# Patient Record
Sex: MALE | Race: WHITE | NOT HISPANIC OR LATINO | Employment: FULL TIME | URBAN - NONMETROPOLITAN AREA
[De-identification: names, ages, dates, MRNs, and addresses within clinical notes are randomized per-mention and may not be internally consistent; named-entity substitution may affect disease eponyms.]

---

## 2021-07-16 NOTE — PROGRESS NOTES
"Subjective    Mr. Metzger is 41 y.o. male    Chief Complaint: Urinary Frequency    History of Present Illness  Patient is a 41-year-old gentleman who presents today as a new patient.  He presents with worsening intermittent nocturia frequency urgency and at times some hesitancy with feeling of incomplete bladder emptying.  This is occurred for over the past 2 to 3 years.  He has seen Dr. Crockett at [facility].  He was placed on tamsulosin also was treated with a few antibiotics for suspected prostatitis.  Patient states he has had no appreciable change in his symptoms either during antibiotic or being on tamsulosin.  Urologic history patient had episode of suspected prostatitis in his early 20s that has been the only other urologic problem he has had in the past.  No personal or family history of prostate cancer.  No history of kidney stones or hematuria.      The following portions of the patient's history were reviewed and updated as appropriate: allergies, current medications, past family history, past medical history, past social history, past surgical history and problem list.    Review of Systems    No current outpatient medications on file.    History reviewed. No pertinent past medical history.    Past Surgical History:   Procedure Laterality Date   • WISDOM TOOTH EXTRACTION         Social History     Socioeconomic History   • Marital status:      Spouse name: Not on file   • Number of children: Not on file   • Years of education: Not on file   • Highest education level: Not on file   Tobacco Use   • Smoking status: Never Smoker   • Smokeless tobacco: Never Used   Vaping Use   • Vaping Use: Never used   Substance and Sexual Activity   • Alcohol use: Never   • Drug use: Never   • Sexual activity: Yes     Partners: Female       Family History   Problem Relation Age of Onset   • Hypertension Father        Objective    Temp 97.6 °F (36.4 °C) (Temporal)   Ht 191.8 cm (75.5\")   Wt 94.5 kg (208 lb 6.4 oz)   " BMI 25.70 kg/m²     Physical Exam  Vitals reviewed.   Constitutional:       Appearance: Normal appearance.   HENT:      Head: Normocephalic and atraumatic.      Right Ear: External ear normal.      Left Ear: External ear normal.   Eyes:      Conjunctiva/sclera: Conjunctivae normal.   Pulmonary:      Effort: Pulmonary effort is normal.   Abdominal:      Palpations: Abdomen is soft.      Tenderness: There is no abdominal tenderness. There is no right CVA tenderness or left CVA tenderness.   Genitourinary:     Prostate: Enlarged and tender. No nodules present.      Rectum: Normal.      Comments: Digital rectal exam revealed a smooth soft symmetric mildly enlarged prostate no suspicious lesions masses or firmness or asymmetry were palpated.  Some mild tenderness with palpation.  Skin:     General: Skin is warm and dry.   Neurological:      General: No focal deficit present.      Mental Status: He is alert and oriented to person, place, and time.   Psychiatric:         Mood and Affect: Mood normal.         Behavior: Behavior normal.             Results for orders placed or performed in visit on 07/23/21   POC Urinalysis Dipstick, Multipro    Specimen: Urine   Result Value Ref Range    Color Yellow Yellow, Straw, Dark Yellow, Cathy    Clarity, UA Clear Clear    Glucose, UA Negative Negative, 1000 mg/dL (3+) mg/dL    Bilirubin Negative Negative    Ketones, UA Negative Negative    Specific Gravity  1.025 1.005 - 1.030    Blood, UA Negative Negative    pH, Urine 6.5 5.0 - 8.0    Protein, POC Negative Negative mg/dL    Urobilinogen, UA Normal Normal    Nitrite, UA Negative Negative    Leukocytes Negative Negative       .Bladder Scan interpretation  Estimation of residual urine via abdominal ultrasound  Residual Urine: 135 ml  Indication: Urinary urgency  Position: Supine  Examination: Incremental scanning of the suprapubic area using 3 MHz transducer using copious amounts of acoustic gel.   Findings: An anechoic area was  demonstrated which represented the bladder, with measurement of residual urine as noted. I inspected this myself. In that the residual urine was stable or insignificant, no treatment will be necessary at this time.     Assessment and Plan    Diagnoses and all orders for this visit:    1. Urinary frequency (Primary)  -     POC Urinalysis Dipstick, Multipro  -     US Renal Bilateral; Future    2. Nocturia  -     US Renal Bilateral; Future    Patient with gradually worsening approximately 2 to 3-year history of frequency and nocturia which is intermittent.  He has been on tamsulosin and states he has had no appreciable improvement in symptoms.  Also was placed on few antibiotic Dr. Crockett.  He has not had any imaging studies or any other urologic procedures.      Patient had a renal ultrasound both to evaluate renal anatomy and bladder and given his continued symptoms and being on tamsulosin would like to have his lower urinary tract.  Evaluated further and will schedule him for cystoscopy with Dr. Garcia.

## 2021-07-23 ENCOUNTER — OFFICE VISIT (OUTPATIENT)
Dept: UROLOGY | Facility: CLINIC | Age: 41
End: 2021-07-23

## 2021-07-23 VITALS — BODY MASS INDEX: 25.38 KG/M2 | WEIGHT: 208.4 LBS | TEMPERATURE: 97.6 F | HEIGHT: 76 IN

## 2021-07-23 DIAGNOSIS — R35.1 NOCTURIA: ICD-10-CM

## 2021-07-23 DIAGNOSIS — R35.0 URINARY FREQUENCY: Primary | ICD-10-CM

## 2021-07-23 LAB
BILIRUB BLD-MCNC: NEGATIVE MG/DL
CLARITY, POC: CLEAR
COLOR UR: YELLOW
GLUCOSE UR STRIP-MCNC: NEGATIVE MG/DL
KETONES UR QL: NEGATIVE
LEUKOCYTE EST, POC: NEGATIVE
NITRITE UR-MCNC: NEGATIVE MG/ML
PH UR: 6.5 [PH] (ref 5–8)
PROT UR STRIP-MCNC: NEGATIVE MG/DL
RBC # UR STRIP: NEGATIVE /UL
SP GR UR: 1.02 (ref 1–1.03)
UROBILINOGEN UR QL: NORMAL

## 2021-07-23 PROCEDURE — 51798 US URINE CAPACITY MEASURE: CPT | Performed by: PHYSICIAN ASSISTANT

## 2021-07-23 PROCEDURE — 81001 URINALYSIS AUTO W/SCOPE: CPT | Performed by: PHYSICIAN ASSISTANT

## 2021-07-23 PROCEDURE — 99202 OFFICE O/P NEW SF 15 MIN: CPT | Performed by: PHYSICIAN ASSISTANT

## 2021-07-29 ENCOUNTER — HOSPITAL ENCOUNTER (OUTPATIENT)
Dept: ULTRASOUND IMAGING | Facility: HOSPITAL | Age: 41
Discharge: HOME | End: 2021-07-29
Admitting: PHYSICIAN ASSISTANT

## 2021-07-29 ENCOUNTER — PROCEDURE VISIT (OUTPATIENT)
Dept: UROLOGY | Facility: CLINIC | Age: 41
End: 2021-07-29

## 2021-07-29 DIAGNOSIS — R35.1 NOCTURIA: ICD-10-CM

## 2021-07-29 DIAGNOSIS — R35.0 URINARY FREQUENCY: Primary | ICD-10-CM

## 2021-07-29 DIAGNOSIS — R35.0 URINARY FREQUENCY: ICD-10-CM

## 2021-07-29 PROCEDURE — 52000 CYSTOURETHROSCOPY: CPT | Performed by: UROLOGY

## 2021-07-29 PROCEDURE — 81001 URINALYSIS AUTO W/SCOPE: CPT | Performed by: UROLOGY

## 2021-07-29 PROCEDURE — 76775 US EXAM ABDO BACK WALL LIM: CPT

## 2021-07-29 RX ORDER — ALFUZOSIN HYDROCHLORIDE 10 MG/1
10 TABLET, EXTENDED RELEASE ORAL NIGHTLY
Qty: 30 TABLET | Refills: 11 | Status: SHIPPED | OUTPATIENT
Start: 2021-07-29

## 2021-07-29 NOTE — PROGRESS NOTES
Pre- operative diagnosis:  Lower urinary tract symptoms    Post operative diagnosis:  Elevated bladder neck    Procedure:  The patient was prepped and draped in a normal sterile fashion.  The urethra was anesthetized with 2% lidocaine jelly.  A flexible cystoscope was introduced per urethra.      Urethra:  Normal    Bladder:  Normal mucosa, No bladder tumor and heavy trabeculation    Ureteral orifices:  Normal position bilaterally and Clear efflux bilaterally    Prostate:  Elevated bladder neck.  Minimal lateral lobe hypertrophy.    Patient tolerated the procedure well    Complications: none    Blood loss: minimal    Uroflow after cystoscopy shows Q-Omari of 18.9, Q average 7.1, voided volume 112. PVR by bladder scan ultrasound was only 19 cc.    Follow up:    Routine follow up-6 weeks with me.  Cystoscopy with evidence of elevated bladder neck concerning for possible primary bladder neck obstruction given his longstanding obstructive LUTS refractory to previous therapy with tamsulosin.  We discussed options for transurethral incision of the prostate versus trial of a different alpha-blocker.  Patient would like to start trial of alfuzosin.  Uroflow was performed after the cystoscopy.  Patient leaked a significant portion of his bladder volume after the cystoscopy before he could begin the uroflow.  During the uroflow, patient became vasovagal and briefly passed out.  We gently laid him on the floor and elevate his legs.  He immediately regained consciousness and his color returned.  His vital signs remained stable.  He was alert and oriented x3.  Prior to leaving clinic, patient felt all normal.  He will follow-up with me in 6 months to reassess therapy of this different alpha-blocker versus discuss possible TUIP.      This document has been signed by HASMUKH Garcia MD on July 29, 2021 17:43 CDT

## 2021-09-08 NOTE — PROGRESS NOTES
"Subjective    Mr. Metzger is 41 y.o. male    Chief Complaint: Urinary Frequency    History of Present Illness  41-year-old male follow-up for mixed LUTS after being placed on alfuzosin on 7/29/2021 at which time cystoscopy revealed markedly elevated bladder neck.  He became vagal during the uroflow after cystoscopy.  He reports his LUTS are overall improved on the alfuzosin.    The following portions of the patient's history were reviewed and updated as appropriate: allergies, current medications, past family history, past medical history, past social history, past surgical history and problem list.    Review of Systems   Constitutional: Negative for chills and fever.   Gastrointestinal: Negative for abdominal pain, anal bleeding and blood in stool.   Genitourinary: Negative for dysuria and hematuria.         Current Outpatient Medications:   •  alfuzosin (UROXATRAL) 10 MG 24 hr tablet, Take 1 tablet by mouth Every Night., Disp: 30 tablet, Rfl: 11    History reviewed. No pertinent past medical history.    Past Surgical History:   Procedure Laterality Date   • WISDOM TOOTH EXTRACTION         Social History     Socioeconomic History   • Marital status:      Spouse name: Not on file   • Number of children: Not on file   • Years of education: Not on file   • Highest education level: Not on file   Tobacco Use   • Smoking status: Never Smoker   • Smokeless tobacco: Never Used   Vaping Use   • Vaping Use: Never used   Substance and Sexual Activity   • Alcohol use: Never   • Drug use: Never   • Sexual activity: Yes     Partners: Female       Family History   Problem Relation Age of Onset   • Hypertension Father        Objective    Temp 96.9 °F (36.1 °C)   Ht 191.8 cm (75.5\")   Wt 95.7 kg (211 lb)   BMI 26.03 kg/m²     Physical Exam        Results for orders placed or performed in visit on 09/10/21   POC Urinalysis Dipstick, Multipro    Specimen: Urine   Result Value Ref Range    Color Yellow Yellow, Straw, Dark " Yellow, Cathy    Clarity, UA Clear Clear    Glucose, UA Negative Negative, 1000 mg/dL (3+) mg/dL    Bilirubin Negative Negative    Ketones, UA Negative Negative    Specific Gravity  1.025 1.005 - 1.030    Blood, UA Negative Negative    pH, Urine 5.5 5.0 - 8.0    Protein, POC Negative Negative mg/dL    Urobilinogen, UA Normal Normal    Nitrite, UA Negative Negative    Leukocytes Negative Negative     International Prostate Symptom Score  The following is posted based on patient questionnaire answers:  0 - not at all    1-7 mild symptoms  1- Less than one time in five  8-19 moderate symptoms  2 -Less than half the time  20-35 severe symptoms  3 - About half the time  4 - More than half the time  5 - Almost always     For following sections:  Incomplete Emptying: - How often have you had the sensation  of not emptying your bladder completely after you finished urinating?  2  Frequency: -How often have you had to urinate again less than   two hours after you finished urinating?      1  Intermittency: -How often have you found you stopped and started again  Several times when you urinate?       1  Urgency: -How often do you find it difficult to postpone urination?             2  Weak stream: - How often have you had a weak urinary stream?             1  Straining: - How often have you had to push or strain to begin  Urination?          2  Sleeping: -How many times did you most typically get up to urinate   From the time you went to bed at night until the time you got up in the   1  Morning          Total `  10    Quality of Life  How would you feel if you had to live with your urinary condition the way   3  It is now, no better, no worse for the rest of your life?    Where: 0=delighted; 1= pleased, 2= mostly satisfied, 3= mixed, 4 = mostly  Dissatisfied, 5= Unhappy, 6 = terrible    Bladder Scan interpretation  Estimation of residual urine via abdominal ultrasound  Residual Urine: 37ml  Indication: Urinary  Frequency  Position: Supine  Examination: Incremental scanning of the suprapubic area using 3 MHz transducer using copious amounts of acoustic gel.   Findings: An anechoic area was demonstrated which represented the bladder, with measurement of residual urine as noted. I inspected this myself. In that the residual urine was stable or insignificant, no treatment will be necessary at this time.       Assessment and Plan    Diagnoses and all orders for this visit:    1. Urinary frequency (Primary)  -     POC Urinalysis Dipstick, Multipro        Patient overall pleased on his current therapy with alpha-blocker.  Normal PVR today.  Continue alfuzosin.  He will follow-up with [facility] in 6 months or sooner as needed.  Consider TUIP in the future for his elevated bladder neck.      This document has been signed by HASMUKH Garcia MD on September 10, 2021 19:35 CDT

## 2021-09-10 ENCOUNTER — OFFICE VISIT (OUTPATIENT)
Dept: UROLOGY | Facility: CLINIC | Age: 41
End: 2021-09-10

## 2021-09-10 VITALS — WEIGHT: 211 LBS | BODY MASS INDEX: 25.69 KG/M2 | HEIGHT: 76 IN | TEMPERATURE: 96.9 F

## 2021-09-10 DIAGNOSIS — R35.0 URINARY FREQUENCY: Primary | ICD-10-CM

## 2021-09-10 LAB
BILIRUB BLD-MCNC: NEGATIVE MG/DL
CLARITY, POC: CLEAR
COLOR UR: YELLOW
GLUCOSE UR STRIP-MCNC: NEGATIVE MG/DL
KETONES UR QL: NEGATIVE
LEUKOCYTE EST, POC: NEGATIVE
NITRITE UR-MCNC: NEGATIVE MG/ML
PH UR: 5.5 [PH] (ref 5–8)
PROT UR STRIP-MCNC: NEGATIVE MG/DL
RBC # UR STRIP: NEGATIVE /UL
SP GR UR: 1.02 (ref 1–1.03)
UROBILINOGEN UR QL: NORMAL

## 2021-09-10 PROCEDURE — 51798 US URINE CAPACITY MEASURE: CPT | Performed by: UROLOGY

## 2021-09-10 PROCEDURE — 81003 URINALYSIS AUTO W/O SCOPE: CPT | Performed by: UROLOGY

## 2021-09-10 PROCEDURE — 99213 OFFICE O/P EST LOW 20 MIN: CPT | Performed by: UROLOGY
